# Patient Record
Sex: MALE | Race: WHITE | NOT HISPANIC OR LATINO | Employment: OTHER | URBAN - METROPOLITAN AREA
[De-identification: names, ages, dates, MRNs, and addresses within clinical notes are randomized per-mention and may not be internally consistent; named-entity substitution may affect disease eponyms.]

---

## 2018-03-01 PROBLEM — J30.1 CHRONIC SEASONAL ALLERGIC RHINITIS DUE TO POLLEN: Status: ACTIVE | Noted: 2018-03-01

## 2018-03-01 PROBLEM — B35.4 TINEA CORPORIS: Status: ACTIVE | Noted: 2018-03-01

## 2019-09-04 PROBLEM — Z79.01 CHRONIC ANTICOAGULATION: Status: ACTIVE | Noted: 2019-09-04

## 2020-09-10 PROBLEM — D61.818 PANCYTOPENIA: Status: ACTIVE | Noted: 2020-09-10

## 2020-09-22 ENCOUNTER — LAB VISIT (OUTPATIENT)
Dept: LAB | Facility: HOSPITAL | Age: 80
End: 2020-09-22
Attending: HOSPITALIST
Payer: MEDICARE

## 2020-09-22 DIAGNOSIS — D53.9 MACROCYTIC ANEMIA: ICD-10-CM

## 2020-09-22 DIAGNOSIS — D61.818 PANCYTOPENIA: ICD-10-CM

## 2020-09-22 DIAGNOSIS — D50.0 ANEMIA DUE TO CHRONIC BLOOD LOSS: ICD-10-CM

## 2020-09-22 LAB
ALBUMIN SERPL BCP-MCNC: 3.6 G/DL (ref 3.5–5.2)
ALP SERPL-CCNC: 76 U/L (ref 38–145)
ALT SERPL W/O P-5'-P-CCNC: 6 U/L (ref 0–50)
ANION GAP SERPL CALC-SCNC: 4 MMOL/L (ref 8–16)
AST SERPL-CCNC: 20 U/L (ref 17–59)
BASOPHILS # BLD AUTO: 0.04 K/UL (ref 0–0.2)
BASOPHILS NFR BLD: 0.8 % (ref 0–1.9)
BILIRUB SERPL-MCNC: 1 MG/DL (ref 0.2–1.3)
BUN SERPL-MCNC: 15 MG/DL (ref 9–21)
CALCIUM SERPL-MCNC: 9.4 MG/DL (ref 8.4–10.2)
CHLORIDE SERPL-SCNC: 103 MMOL/L (ref 95–110)
CO2 SERPL-SCNC: 30 MMOL/L (ref 22–31)
CREAT SERPL-MCNC: 1.12 MG/DL (ref 0.5–1.4)
DIFFERENTIAL METHOD: ABNORMAL
EOSINOPHIL # BLD AUTO: 0.5 K/UL (ref 0–0.5)
EOSINOPHIL NFR BLD: 10.4 % (ref 0–8)
ERYTHROCYTE [DISTWIDTH] IN BLOOD BY AUTOMATED COUNT: 14.6 % (ref 11.5–14.5)
EST. GFR  (AFRICAN AMERICAN): >60 ML/MIN/1.73 M^2
EST. GFR  (NON AFRICAN AMERICAN): >60 ML/MIN/1.73 M^2
FERRITIN SERPL-MCNC: 51 NG/ML (ref 18–464)
FIBRINOGEN PPP-MCNC: 383 MG/DL (ref 234–538)
FOLATE SERPL-MCNC: 5.8 NG/ML (ref 4–24)
GLUCOSE SERPL-MCNC: 106 MG/DL (ref 70–110)
HAPTOGLOB SERPL-MCNC: 123 MG/DL (ref 30–200)
HBV SURFACE AG SERPL QL IA: NEGATIVE
HCT VFR BLD AUTO: 44.2 % (ref 40–54)
HCV AB SERPL QL IA: NEGATIVE
HGB BLD-MCNC: 14.3 G/DL (ref 14–18)
IMM GRANULOCYTES # BLD AUTO: 0.02 K/UL (ref 0–0.04)
IMM GRANULOCYTES NFR BLD AUTO: 0.4 % (ref 0–0.5)
IRON SATN MFR SERPL: 44 % (ref 20–50)
IRON SERPL-MCNC: 135 UG/DL (ref 45–160)
LDH SERPL L TO P-CCNC: 311 U/L (ref 313–618)
LYMPHOCYTES # BLD AUTO: 1.3 K/UL (ref 1–4.8)
LYMPHOCYTES NFR BLD: 24.8 % (ref 18–48)
MCH RBC QN AUTO: 31.7 PG (ref 27–31)
MCHC RBC AUTO-ENTMCNC: 32.4 G/DL (ref 32–36)
MCV RBC AUTO: 98 FL (ref 82–98)
MONOCYTES # BLD AUTO: 0.4 K/UL (ref 0.3–1)
MONOCYTES NFR BLD: 7.1 % (ref 4–15)
NEUTROPHILS # BLD AUTO: 2.9 K/UL (ref 1.8–7.7)
NEUTROPHILS NFR BLD: 56.5 % (ref 38–73)
NRBC BLD-RTO: 0 /100 WBC
PLATELET # BLD AUTO: 168 K/UL (ref 150–350)
PMV BLD AUTO: 10 FL (ref 9.2–12.9)
POTASSIUM SERPL-SCNC: 4.5 MMOL/L (ref 3.5–5.1)
PROT SERPL-MCNC: 7.1 G/DL (ref 6–8.4)
RBC # BLD AUTO: 4.51 M/UL (ref 4.6–6.2)
SODIUM SERPL-SCNC: 137 MMOL/L (ref 136–145)
TOTAL IRON BINDING CAPACITY: 309 UG/DL (ref 261–462)
TSH SERPL DL<=0.005 MIU/L-ACNC: 2.5 UIU/ML (ref 0.4–4)
VIT B12 SERPL-MCNC: <200 PG/ML (ref 210–950)
WBC # BLD AUTO: 5.08 K/UL (ref 3.9–12.7)

## 2020-09-22 PROCEDURE — 84443 ASSAY THYROID STIM HORMONE: CPT

## 2020-09-22 PROCEDURE — 83615 LACTATE (LD) (LDH) ENZYME: CPT

## 2020-09-22 PROCEDURE — 85025 COMPLETE CBC W/AUTO DIFF WBC: CPT

## 2020-09-22 PROCEDURE — 83010 ASSAY OF HAPTOGLOBIN QUANT: CPT | Mod: PN

## 2020-09-22 PROCEDURE — 87340 HEPATITIS B SURFACE AG IA: CPT | Mod: PN

## 2020-09-22 PROCEDURE — 87340 HEPATITIS B SURFACE AG IA: CPT

## 2020-09-22 PROCEDURE — 82607 VITAMIN B-12: CPT

## 2020-09-22 PROCEDURE — 86703 HIV-1/HIV-2 1 RESULT ANTBDY: CPT

## 2020-09-22 PROCEDURE — 83010 ASSAY OF HAPTOGLOBIN QUANT: CPT

## 2020-09-22 PROCEDURE — 85384 FIBRINOGEN ACTIVITY: CPT | Mod: PN

## 2020-09-22 PROCEDURE — 82607 VITAMIN B-12: CPT | Mod: PN

## 2020-09-22 PROCEDURE — 80053 COMPREHEN METABOLIC PANEL: CPT

## 2020-09-22 PROCEDURE — 82746 ASSAY OF FOLIC ACID SERUM: CPT | Mod: PN

## 2020-09-22 PROCEDURE — 84443 ASSAY THYROID STIM HORMONE: CPT | Mod: PN

## 2020-09-22 PROCEDURE — 82525 ASSAY OF COPPER: CPT

## 2020-09-22 PROCEDURE — 84630 ASSAY OF ZINC: CPT

## 2020-09-22 PROCEDURE — 82728 ASSAY OF FERRITIN: CPT

## 2020-09-22 PROCEDURE — 83540 ASSAY OF IRON: CPT | Mod: PN

## 2020-09-22 PROCEDURE — 82746 ASSAY OF FOLIC ACID SERUM: CPT

## 2020-09-22 PROCEDURE — 86803 HEPATITIS C AB TEST: CPT | Mod: PN

## 2020-09-22 PROCEDURE — 86803 HEPATITIS C AB TEST: CPT

## 2020-09-22 PROCEDURE — 83615 LACTATE (LD) (LDH) ENZYME: CPT | Mod: PN

## 2020-09-22 PROCEDURE — 85384 FIBRINOGEN ACTIVITY: CPT

## 2020-09-22 PROCEDURE — 82728 ASSAY OF FERRITIN: CPT | Mod: PN

## 2020-09-22 PROCEDURE — 85025 COMPLETE CBC W/AUTO DIFF WBC: CPT | Mod: PN

## 2020-09-22 PROCEDURE — 83540 ASSAY OF IRON: CPT

## 2020-09-22 PROCEDURE — 36415 COLL VENOUS BLD VENIPUNCTURE: CPT | Mod: PN

## 2020-09-22 PROCEDURE — 80053 COMPREHEN METABOLIC PANEL: CPT | Mod: PN

## 2020-09-23 LAB — HIV 1+2 AB+HIV1 P24 AG SERPL QL IA: NEGATIVE

## 2020-09-25 LAB
COPPER SERPL-MCNC: 840 UG/L (ref 665–1480)
ZINC SERPL-MCNC: 70 UG/DL (ref 60–130)

## 2020-09-29 PROBLEM — E53.8 B12 DEFICIENCY: Status: ACTIVE | Noted: 2020-09-29

## 2021-01-22 PROBLEM — I89.0 LYMPHEDEMA OF BOTH LOWER EXTREMITIES: Status: ACTIVE | Noted: 2021-01-22

## 2021-04-21 PROBLEM — I89.0 LYMPHEDEMA OF BOTH LOWER EXTREMITIES: Status: RESOLVED | Noted: 2021-01-22 | Resolved: 2021-04-21

## 2022-09-29 PROBLEM — N18.31 CHRONIC KIDNEY DISEASE, STAGE 3A: Status: ACTIVE | Noted: 2022-09-29

## 2023-01-30 DIAGNOSIS — U07.1 COVID-19 VIRUS DETECTED: ICD-10-CM

## 2024-03-01 PROBLEM — R30.0 DYSURIA: Status: ACTIVE | Noted: 2024-03-01

## 2024-05-14 PROBLEM — D61.818 PANCYTOPENIA: Status: RESOLVED | Noted: 2020-09-10 | Resolved: 2024-05-14

## 2024-05-14 PROBLEM — D61.818 PANCYTOPENIA: Status: RESOLVED | Noted: 2020-09-22 | Resolved: 2024-05-14

## 2024-05-14 PROBLEM — F32.0 CURRENT MILD EPISODE OF MAJOR DEPRESSIVE DISORDER WITHOUT PRIOR EPISODE: Status: ACTIVE | Noted: 2024-05-14

## 2024-08-15 ENCOUNTER — OFFICE VISIT (OUTPATIENT)
Dept: FAMILY MEDICINE | Facility: CLINIC | Age: 84
End: 2024-08-15
Payer: OTHER GOVERNMENT

## 2024-08-15 VITALS
WEIGHT: 273.5 LBS | DIASTOLIC BLOOD PRESSURE: 62 MMHG | BODY MASS INDEX: 39.16 KG/M2 | OXYGEN SATURATION: 97 % | HEART RATE: 51 BPM | SYSTOLIC BLOOD PRESSURE: 86 MMHG | HEIGHT: 70 IN

## 2024-08-15 DIAGNOSIS — H53.8 BLURRY VISION, BILATERAL: ICD-10-CM

## 2024-08-15 DIAGNOSIS — I48.0 PAROXYSMAL ATRIAL FIBRILLATION: ICD-10-CM

## 2024-08-15 DIAGNOSIS — Z76.89 ENCOUNTER TO ESTABLISH CARE WITH NEW DOCTOR: Primary | ICD-10-CM

## 2024-08-15 PROCEDURE — 99204 OFFICE O/P NEW MOD 45 MIN: CPT | Mod: S$PBB,,, | Performed by: STUDENT IN AN ORGANIZED HEALTH CARE EDUCATION/TRAINING PROGRAM

## 2024-08-15 PROCEDURE — 99215 OFFICE O/P EST HI 40 MIN: CPT | Mod: PBBFAC,PO | Performed by: STUDENT IN AN ORGANIZED HEALTH CARE EDUCATION/TRAINING PROGRAM

## 2024-08-15 PROCEDURE — 99999 PR PBB SHADOW E&M-EST. PATIENT-LVL V: CPT | Mod: PBBFAC,,, | Performed by: STUDENT IN AN ORGANIZED HEALTH CARE EDUCATION/TRAINING PROGRAM

## 2024-08-15 RX ORDER — BUDESONIDE AND FORMOTEROL FUMARATE DIHYDRATE 160; 4.5 UG/1; UG/1
2 AEROSOL RESPIRATORY (INHALATION) EVERY 12 HOURS PRN
COMMUNITY

## 2024-08-15 NOTE — PATIENT INSTRUCTIONS
Find out about whether you are supposed to continue to follow with Dr. Balderas as your primary care or with me. Let me know.    Refer to Optometry in [place] with Kanwal for blurry vision.

## 2024-08-15 NOTE — PROGRESS NOTES
Subjective:       Patient ID: Sean Morris is a 83 y.o. male.    Chief Complaint: Establish Care    83 year old male presents because [facility] told [facility] come here to see a primary care provider. However, he already has a primary care provider - he follows with outside internist, Dr. Balderas in [place]. When asked if he wants to switch he says no, unless he has to. His [facility] is [facility] Dr[facility] Rendon. He was told he needs to have his carotids checked and his eyes checked. He is encouraged to get back with the [facility] to clarify what he needs from me versus them versus Dr. Balderas.    His blood pressure is low. He feels fine. He always runs low and follows with cardiology, Dr. Valencia by [place] and he was told it is okay for him to run low as long as he is not feeling tired, weak, or dizzy. He has seen Dr. Valencia once and before that saw Dr. Love but he left. At home his blood pressure is usually around 100-110/50-60. He admits that he has not eaten anything since this morning and it already mid afternoon. I will order carotid ultrasounds, though he may want to return to see Dr. Vaelncia to get checked. He is encouraged to pick and just don't have them checked twice by mistake.    As far as his eyes, he has a lazy left eye and his right eye has had cataract. He has noticed the left eye with lower half of his vision has been blurry. In his right eye this happens occasionally also but not as often.    Physical exam is notable for left sided cerumen impaction. I am able to remove a small amount with the curette, but there is still total obstruction. The patient declines getting it flushed out or referral to come back to get it cleaned out.    Plan:  - Refer to his eye doctor in [place], Dr. Dobbs.  - Ultrasound of the carotids.  - Refer back to Dr. Valencia cardiology, because he is due for follow up appointment.  - The patient is encouraged to talk with the [facility] to clarify if he can continue to follow with Dr. Balderas or  if for some reason he is supposed to come see me.        Past Medical History:   Diagnosis Date    Depression     Lymphedema of both lower extremities 1/22/2021    Prostatitis        Past Surgical History:   Procedure Laterality Date    birth tye removal       from forehead    CATARACT EXTRACTION W/  INTRAOCULAR LENS IMPLANT Right     CHOLECYSTECTOMY  07/12/2016       Review of patient's allergies indicates:   Allergen Reactions    Erythromycin Rash       Social History     Socioeconomic History    Marital status:      Spouse name: Emi    Number of children: 2   Occupational History    Occupation: retired   Tobacco Use    Smoking status: Former     Types: Cigarettes     Passive exposure: Current    Smokeless tobacco: Never    Tobacco comments:     Quit 1990   Substance and Sexual Activity    Alcohol use: No     Alcohol/week: 0.0 standard drinks of alcohol    Drug use: No    Sexual activity: Not Currently     Partners: Female       Current Outpatient Medications on File Prior to Visit   Medication Sig Dispense Refill    bumetanide (BUMEX) 0.5 MG Tab Take 1 tablet (0.5 mg total) by mouth once daily. 90 tablet 3    ELIQUIS 5 mg Tab take 1 tablet (5 MILLIGRAM total) by mouth 2 (two) times daily. 180 tablet 0    levothyroxine (SYNTHROID) 50 MCG tablet take one tablet by mouth once daily 90 tablet 0    metoprolol succinate (TOPROL-XL) 25 MG 24 hr tablet TAKE 1 TABLET BY MOUTH DAILY 90 tablet 3    omeprazole (PRILOSEC) 20 MG capsule TAKE 1 CAPSULE BY MOUTH DAILY 90 capsule 1    sotaloL (BETAPACE) 80 MG tablet take 1 tablet (80 milligram total) by mouth 2 (two) times daily. 60 tablet 2    tamsulosin (FLOMAX) 0.4 mg Cap TAKE 1 CAPSULE BY MOUTH ONCE DAILY. 90 capsule 0    tiZANidine (ZANAFLEX) 4 MG tablet take 1/2 tablets (2 milligram total) by mouth nightly as needed (back pain). 30 tablet 0    budesonide-formoterol 160-4.5 mcg (SYMBICORT) 160-4.5 mcg/actuation HFAA Inhale 2 puffs into the lungs every 12 (twelve)  "hours as needed (copd). Controller       No current facility-administered medications on file prior to visit.       Family History   Problem Relation Name Age of Onset    Heart failure Father         Review of Systems    Objective:      BP (!) 86/62   Pulse (!) 51   Ht 5' 10" (1.778 m)   Wt 124 kg (273 lb 7.7 oz)   SpO2 97%   BMI 39.24 kg/m²   Physical Exam  Constitutional:       General: He is not in acute distress.     Appearance: He is not ill-appearing, toxic-appearing or diaphoretic.   HENT:      Right Ear: Tympanic membrane normal.      Ears:      Comments: Left cerumen impaction     Mouth/Throat:      Mouth: Mucous membranes are moist.   Eyes:      Conjunctiva/sclera: Conjunctivae normal.      Comments: Disconjugate gaze   Cardiovascular:      Rate and Rhythm: Regular rhythm. Bradycardia present.      Pulses: Normal pulses.      Comments: Diminished heart and lung sounds  Pulmonary:      Effort: Pulmonary effort is normal.   Abdominal:      General: Bowel sounds are normal.      Palpations: Abdomen is soft. There is no mass.      Tenderness: There is no abdominal tenderness.   Skin:     General: Skin is warm and dry.   Neurological:      Mental Status: He is alert.      Comments: Disconjugate gaze; symmetric facies; clear speech   Psychiatric:         Mood and Affect: Mood normal.         Behavior: Behavior normal.         Assessment:       1. Encounter to establish care with new doctor    2. Blurry vision, bilateral    3. Paroxysmal atrial fibrillation        Plan:       Encounter to establish care with new doctor    Blurry vision, bilateral  -     Ambulatory referral/consult to Optometry; Future; Expected date: 08/22/2024  -     US Carotid Bilateral; Future; Expected date: 08/15/2024  -     CV Ultrasound Bilateral Doppler Carotid; Future    Paroxysmal atrial fibrillation  -     Ambulatory referral/consult to Cardiology; Future; Expected date: 08/22/2024        Return to clinic in one month versus " continue to follow as he intended with Dr. Balderas. Go [facility] with [facility]endon, to figure out what is necessary.

## 2024-08-21 ENCOUNTER — TELEPHONE (OUTPATIENT)
Dept: FAMILY MEDICINE | Facility: CLINIC | Age: 84
End: 2024-08-21
Payer: OTHER GOVERNMENT

## 2024-08-21 NOTE — TELEPHONE ENCOUNTER
----- Message from Latonia Marquez, DO sent at 8/18/2024  7:21 PM CDT -----  Regarding: Does he need both carotid ultrasound appointments?  Please check on this and update me. Thank you.

## 2024-08-21 NOTE — TELEPHONE ENCOUNTER
Reached out to Shakeel Maguire - Cardiology Doppler sent secure chat & waiting for reply also left voicemail w Radiology.     Per Shakeel Anne They are the same exam.  One is scheduled for the cardiology vascular lab, and the other is scheduled in Radiology.  One os them may be cancelled.   preference as to where the study is performed/read.  Contact the patient to see if one time is better than the other.     Do you have any preference?

## 2024-09-09 ENCOUNTER — TELEPHONE (OUTPATIENT)
Dept: FAMILY MEDICINE | Facility: CLINIC | Age: 84
End: 2024-09-09
Payer: OTHER GOVERNMENT

## 2024-09-09 NOTE — TELEPHONE ENCOUNTER
----- Message from Latonia Marquez, DO sent at 9/6/2024 12:22 PM CDT -----  Regarding: Cancel follow up/remove me as PCP  Just called this patient about med refill request - he says he has already filled it. He has decided to cancel appointment with me and keep Dr. Balderas as primary. Please cancel follow up appointment with me and take me off as PCP for this patient who decided to stick with Dr. Balderas for primary care. Thank you.

## 2024-09-09 NOTE — TELEPHONE ENCOUNTER
Canceled 1 month follow-up w Dr. Marquez per Dr. Marquez since pt's wanting to stay w Dr. Balderas.